# Patient Record
Sex: MALE | Race: WHITE
[De-identification: names, ages, dates, MRNs, and addresses within clinical notes are randomized per-mention and may not be internally consistent; named-entity substitution may affect disease eponyms.]

---

## 2019-09-13 ENCOUNTER — HOSPITAL ENCOUNTER (EMERGENCY)
Dept: HOSPITAL 63 - ER | Age: 59
Discharge: HOME | End: 2019-09-13
Payer: SELF-PAY

## 2019-09-13 VITALS — BODY MASS INDEX: 25.48 KG/M2 | WEIGHT: 178 LBS | HEIGHT: 70 IN

## 2019-09-13 VITALS — DIASTOLIC BLOOD PRESSURE: 77 MMHG | SYSTOLIC BLOOD PRESSURE: 132 MMHG

## 2019-09-13 DIAGNOSIS — Z85.51: ICD-10-CM

## 2019-09-13 DIAGNOSIS — R33.9: Primary | ICD-10-CM

## 2019-09-13 LAB
APTT PPP: (no result) S
BACTERIA #/AREA URNS HPF: (no result) /HPF
BILIRUB UR QL STRIP: (no result)
FIBRINOGEN PPP-MCNC: (no result) MG/DL
GLUCOSE UR STRIP-MCNC: (no result) MG/DL
NITRITE UR QL STRIP: (no result)
RBC #/AREA URNS HPF: >40 /HPF (ref 0–2)
SP GR UR STRIP: 1.01
SQUAMOUS #/AREA URNS LPF: (no result) /LPF
UROBILINOGEN UR-MCNC: 0.2 MG/DL
WBC #/AREA URNS HPF: (no result) /HPF (ref 0–4)

## 2019-09-13 PROCEDURE — 99284 EMERGENCY DEPT VISIT MOD MDM: CPT

## 2019-09-13 PROCEDURE — 87086 URINE CULTURE/COLONY COUNT: CPT

## 2019-09-13 PROCEDURE — 51702 INSERT TEMP BLADDER CATH: CPT

## 2019-09-13 PROCEDURE — 81001 URINALYSIS AUTO W/SCOPE: CPT

## 2019-09-13 NOTE — ED.ADGEN
Past History


Past Medical History:  Cancer, Prostatitis


Past Medical History


Bladder CA


Past Surgical History:  TURP, Other





Adult General


Chief Complaint


Chief Complaint


"... I just got the catherine out today... but I have not urinated since then... "





HPI


HPI





Patient is a 59 year old male who presents with above hx and complaints of 

urinary retention.  Pt. had TURP on  8/19, and 9/9.     by Dr. Yoon at [facility].   

Pt. had early catheter removed today.   Dr. Yoon called instructed nursing to 

replace the cath.     Patient is currently on Cipro. Patient currently has a 

distended bladder.  Catherine placed by nursing and patient had a weakly approximate

500 mL of urine out, with relief of his abdomen pain.





Review of Systems


Review of Systems





Constitutional: Denies fever or chills []


Eyes: Denies change in visual acuity, redness, or eye pain []


HENT: Denies nasal congestion or sore throat []


Respiratory: Denies cough or shortness of breath []


Cardiovascular: No additional information not addressed in HPI []


GI: Denies  nausea, vomiting, bloody stools or diarrhea []complaints of bladder 

distention and pain


: Complains of hematuria and intermittent belly to empty his bladder


Musculoskeletal: Denies back pain or joint pain []


Integument: Denies rash or skin lesions []


Neurologic: Denies headache, focal weakness or sensory changes []


Endocrine: Denies polyuria or polydipsia []





All other systems were reviewed and found to be within normal limits, except as 

documented in this note.





Family History


Family History


Noncontributory





Current Medications


Current Medications





Current Medications








 Medications


  (Trade)  Dose


 Ordered  Sig/Alli  Start Time


 Stop Time Status Last Admin


Dose Admin


 


 Oxycodone/


 Acetaminophen


  (Percocet 5/325)  2 tab  1X  ONCE  9/13/19 21:15


 9/13/19 21:16 DC 9/13/19 21:20


2 TAB


 


 Phenazopyridine


 HCl


  (Pyridium)  200 mg  1X  ONCE  9/13/19 21:15


 9/13/19 21:16 DC 9/13/19 21:20


200 MG











Allergies


Allergies





Allergies








Coded Allergies Type Severity Reaction Last Updated Verified


 


  No Known Drug Allergies    9/13/19 No











Physical Exam


Physical Exam





Constitutional: Well developed, well nourished, in acute distress, non-toxic 

appearance. []


HENT: Normocephalic, atraumatic, bilateral external ears normal, oropharynx 

moist, no oral exudates, nose normal. []


Eyes: PERRLA, EOMI, conjunctiva normal, no discharge. [] 


Neck: Normal range of motion, no tenderness, supple, no stridor. [] 


Cardiovascular:Heart rate regular rhythm, no murmur []


Lungs & Thorax:  Bilateral breath sounds clear to auscultation []


Abdomen: Bowel sounds normal, soft, no tenderness,, no pulsatile masses. 

[Distended bladder


Skin: Warm, dry, no erythema, no rash. [] 


Back: No tenderness, no CVA tenderness. [] 


Extremities: No tenderness, no cyanosis, no clubbing, ROM intact, no edema. [] 


Neurologic: Alert and oriented X 3, normal motor function, normal sensory f

unction, no focal deficits noted. []


Psychologic: Affect anxious, judgement normal, mood normal. []





Current Patient Data


Vital Signs





                                   Vital Signs








  Date Time  Temp Pulse Resp B/P (MAP) Pulse Ox O2 Delivery O2 Flow Rate FiO2


 


9/13/19 21:25 97.7 68 18 132/77 (95) 98 Room Air  








Lab Results





                                Laboratory Tests








Test


 9/13/19


21:25


 


Urine Collection Type U cath  


 


Urine Color Red  


 


Urine Clarity Cloudy  


 


Urine pH 5.5  


 


Urine Specific Gravity 1.015  


 


Urine Protein


 >100 mg/dl


(NEG-TRACE)


 


Urine Glucose (UA)


 Neg mg/dL


(NEG)


 


Urine Ketones (Stick)


 Neg mg/dL


(NEG)


 


Urine Blood Large (NEG)  


 


Urine Nitrite Neg (NEG)  


 


Urine Bilirubin Neg (NEG)  


 


Urine Urobilinogen Dipstick


 0.2 mg/dL (0.2


mg/dL)


 


Urine Leukocyte Esterase Trace (NEG)  


 


Urine RBC


 >40 /HPF (0-2)





 


Urine WBC


 5-10 /HPF


(0-4)


 


Urine Squamous Epithelial


Cells Few /LPF  





 


Urine Bacteria


 Few /HPF


(0-FEW)


 


Urine Mucus Slight /LPF  











EKG


EKG


[]





Radiology/Procedures


Radiology/Procedures


[]





Course & Med Decision Making


Course & Med Decision Making


Pertinent Labs and Imaging studies reviewed. (See chart for details).  Recent 

elects to leave Catherine in until follow-up on Monday with .  Patient 

continue his Cipro as directed. Patient return of any concerns.





[]





Final Impression


Final Impression


1. Urinary Retention[]


2. History of recent TURP


3. Bladder Cancer/Lesion





Dragon Disclaimer


Dragon Disclaimer


This electronic medical record was generated, in whole or in part, using a voice

 recognition dictation system.





Dragon Disclaimer


This chart was dictated in whole or in part using Voice Recognition software in 

a busy, high-work load, and often noisy Emergency Department environment.  It 

may contain unintended and wholly unrecognized errors or omissions.





Dragon Disclaimer


This chart was dictated in whole or in part using Voice Recognition software in 

a busy, high-work load, and often noisy Emergency Department environment.  It 

may contain unintended and wholly unrecognized errors or omissions.











WINSTON DENNISON MD           Sep 13, 2019 20:29